# Patient Record
Sex: MALE | Race: WHITE
[De-identification: names, ages, dates, MRNs, and addresses within clinical notes are randomized per-mention and may not be internally consistent; named-entity substitution may affect disease eponyms.]

---

## 2015-07-24 VITALS
DIASTOLIC BLOOD PRESSURE: 76 MMHG | DIASTOLIC BLOOD PRESSURE: 76 MMHG | DIASTOLIC BLOOD PRESSURE: 76 MMHG | SYSTOLIC BLOOD PRESSURE: 133 MMHG | SYSTOLIC BLOOD PRESSURE: 133 MMHG | SYSTOLIC BLOOD PRESSURE: 133 MMHG

## 2017-03-14 ENCOUNTER — HOSPITAL ENCOUNTER (OUTPATIENT)
Dept: HOSPITAL 61 - LAB | Age: 67
Discharge: HOME | End: 2017-03-14
Attending: ORTHOPAEDIC SURGERY
Payer: COMMERCIAL

## 2017-03-14 DIAGNOSIS — M25.511: Primary | ICD-10-CM

## 2017-03-14 LAB
BASOPHILS # BLD AUTO: 0.1 X10^3/UL (ref 0–0.2)
BASOPHILS NFR BLD: 1 % (ref 0–3)
EOSINOPHIL NFR BLD: 3 % (ref 0–3)
ERYTHROCYTE [DISTWIDTH] IN BLOOD BY AUTOMATED COUNT: 13.9 % (ref 11.5–14.5)
HCT VFR BLD CALC: 48.2 % (ref 39–53)
HGB BLD-MCNC: 16.6 G/DL (ref 13–17.5)
LYMPHOCYTES # BLD: 1.5 X10^3/UL (ref 1–4.8)
LYMPHOCYTES NFR BLD AUTO: 17 % (ref 24–48)
MCH RBC QN AUTO: 30 PG (ref 25–35)
MCHC RBC AUTO-ENTMCNC: 34 G/DL (ref 31–37)
MCV RBC AUTO: 87 FL (ref 79–100)
MONOCYTES NFR BLD: 7 % (ref 0–9)
NEUTROPHILS NFR BLD AUTO: 72 % (ref 31–73)
PLATELET # BLD AUTO: 228 X10^3/UL (ref 140–400)
RBC # BLD AUTO: 5.51 X10^6/UL (ref 4.3–5.7)
WBC # BLD AUTO: 9.2 X10^3/UL (ref 4–11)

## 2017-03-14 PROCEDURE — 85651 RBC SED RATE NONAUTOMATED: CPT

## 2017-03-14 PROCEDURE — 86140 C-REACTIVE PROTEIN: CPT

## 2017-03-14 PROCEDURE — 36415 COLL VENOUS BLD VENIPUNCTURE: CPT

## 2017-03-14 PROCEDURE — 85027 COMPLETE CBC AUTOMATED: CPT

## 2017-03-20 ENCOUNTER — HOSPITAL ENCOUNTER (OUTPATIENT)
Dept: HOSPITAL 61 - KCIC | Age: 67
Discharge: HOME | End: 2017-03-20
Attending: ORTHOPAEDIC SURGERY
Payer: COMMERCIAL

## 2017-03-20 DIAGNOSIS — M75.21: ICD-10-CM

## 2017-03-20 DIAGNOSIS — S43.421A: Primary | ICD-10-CM

## 2017-03-20 PROCEDURE — 72141 MRI NECK SPINE W/O DYE: CPT

## 2017-03-20 PROCEDURE — A9585 GADOBUTROL INJECTION: HCPCS

## 2017-03-20 PROCEDURE — 73222 MRI JOINT UPR EXTREM W/DYE: CPT

## 2017-03-20 PROCEDURE — 73040 CONTRAST X-RAY OF SHOULDER: CPT

## 2017-03-20 NOTE — [facility]
PROCEDURE: 

Right shoulder injection using fluoroscopic guidance, prior to MR. 

HISTORY: 

Shoulder pain. 

TECHNIQUE: 

The procedure was explained to the patient as were potential risks, 

including among others infection, bleeding or allergic reaction. All 

questions were answered. Informed written and verbal consent was obtained.

The shoulder was prepped and draped in the usual sterile manner. Following

administration of local anesthetic, a 22-gauge needle was advanced into 

the anterior shoulder. Following negative aspiration, 12 cc of a solution 

of 5cc Omnipaque-300 contrast, 5 cc 1% lidocaine, 10 cc normal saline, and

0.1 cc gadolinium was injected without difficulty. The needle was removed.

There was good hemostasis at the injection site. The patient left in 

stable condition without immediate complication. The patient was given 

postprocedural instructions, and instructed to contact us or the ER if 

there are any complications. 

A single spot image is obtained. 

FLUOROSCOPY TIME: 38 seconds 

 

Electronically signed by: Luc Castro MD (Mar 20, 2017 16:56:25)

## 2017-03-20 NOTE — [facility]
PROCEDURE 

MRI of the cervical spine without contrast 03/20/2017 

 

HISTORY 

Neck pain which radiates down the right shoulder. 

 

TECHNIQUE 

Unenhanced T1 weighted, T2 weighted and inversion recovery sagittal and 

gradient echo and T2 weighted axial images of the cervical spine were 

obtained. 

 

FINDINGS 

Minimal lateral curvature of the cervical spine is seen convex to the 

right. There is mild straightening of the normal cervical lordosis. 

Degenerative signal changes are seen involving all of the discs of the 

cervical spine. The marrow signal of the visualized bony structures is 

within normal limits. The cervical spinal cord is normal in morphology, 

position, and signal characteristics. 

On the axial images degenerative changes are seen throughout the cervical 

disc spaces consisting of minimal to mild generalized disc bulges and 

degenerative changes involving the uncovertebral and facet joints. Small 

superimposed focal disc protrusions are seen which measure 2 millimeters 

in AP diameter. These findings do not result in significant central spinal

canal or neural foraminal stenosis at any level. 

 

 

IMPRESSION 

Degenerative changes are seen involving the cervical spine. These findings

do not result in significant central spinal canal or neural foraminal 

stenosis at any level. 

 

Electronically signed by: Sj Montano MD (Mar 20, 2017 14:36:00)

## 2017-03-20 NOTE — [facility]
PROCEDURE 

MR arthrogram of the right shoulder 

 

HISTORY 

Right shoulder pain for 6-8 months. Rotator cuff and biceps tendon repair 

May 2016. 

 

COMPARISON 

None 

 

TECHNIQUE 

Intra-articular contrast injected prior to the scan, and reported 

separately. The standard 4 plane sequences were obtained including ABER 

positioning. 

 

FINDINGS 

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 

Evidence of rotator cuff repair with suture anchors at the humeral head. 

Diffuse thinning of the undersurface of supraspinatus and infraspinatus 

tendons, greatest at the supraspinatus myotendinous junction measuring as 

thin as 2 mm.. No evidence of a through and through rupture. No evidence 

of subscapularis tendon tear. Some contrast within the subscapularis is 

thought to be due to the injection. Contrast does accumulate within the 

subdeltoid bursa. 

Blunting and deformity of the superior labrum and to a lesser extent the 

posterior labrum, compatible with tearing, unless postsurgical. The 

proximal biceps tendon is poorly visualized. 

No bone lesion or acute fracture. No acute soft tissue injury. 

 

IMPRESSION 

1. Diffuse thinning of the supraspinatus and infraspinatus tendons, could 

be due to the nature the reattached tendon. No through and through 

full-thickness rupture.

2. Small and blunted posterosuperior labrum compatible with tearing unless

this is postsurgical.

3. Poorly seen proximal biceps tendon.

 

 

 

Electronically signed by: Luc Castro MD (Mar 20, 2017 15:27:45)

## 2017-04-24 ENCOUNTER — HOSPITAL ENCOUNTER (OUTPATIENT)
Dept: HOSPITAL 61 - SURGPAT | Age: 67
Discharge: HOME | End: 2017-04-24
Attending: ORTHOPAEDIC SURGERY
Payer: COMMERCIAL

## 2017-04-24 DIAGNOSIS — Z01.818: Primary | ICD-10-CM

## 2017-04-24 LAB
ANION GAP SERPL CALC-SCNC: 10 MMOL/L (ref 6–14)
APTT BLD: 39 SEC (ref 24–38)
BACTERIA #/AREA URNS HPF: (no result) /HPF
BASOPHILS # BLD AUTO: 0.1 X10^3/UL (ref 0–0.2)
BASOPHILS NFR BLD: 1 % (ref 0–3)
BILIRUB UR QL STRIP: (no result)
BUN SERPL-MCNC: 12 MG/DL (ref 8–26)
CALCIUM SERPL-MCNC: 9 MG/DL (ref 8.5–10.1)
CHLORIDE SERPL-SCNC: 102 MMOL/L (ref 98–107)
CO2 SERPL-SCNC: 30 MMOL/L (ref 21–32)
CREAT SERPL-MCNC: 0.9 MG/DL (ref 0.7–1.3)
EOSINOPHIL NFR BLD: 4 % (ref 0–3)
ERYTHROCYTE [DISTWIDTH] IN BLOOD BY AUTOMATED COUNT: 13.7 % (ref 11.5–14.5)
GFR SERPLBLD BASED ON 1.73 SQ M-ARVRAT: 84.7 ML/MIN
GLUCOSE SERPL-MCNC: 118 MG/DL (ref 70–99)
GLUCOSE UR STRIP-MCNC: NEGATIVE MG/DL
HCT VFR BLD CALC: 46.5 % (ref 39–53)
HGB BLD-MCNC: 15.6 G/DL (ref 13–17.5)
INR PPP: 1.1 (ref 0.8–1.1)
LYMPHOCYTES # BLD: 1.3 X10^3/UL (ref 1–4.8)
LYMPHOCYTES NFR BLD AUTO: 24 % (ref 24–48)
MCH RBC QN AUTO: 30 PG (ref 25–35)
MCHC RBC AUTO-ENTMCNC: 34 G/DL (ref 31–37)
MCV RBC AUTO: 90 FL (ref 79–100)
MONOCYTES NFR BLD: 9 % (ref 0–9)
NEUTROPHILS NFR BLD AUTO: 62 % (ref 31–73)
NITRITE UR QL STRIP: NEGATIVE
PH UR STRIP: 5.5 [PH]
PLATELET # BLD AUTO: 204 X10^3/UL (ref 140–400)
POTASSIUM SERPL-SCNC: 3.1 MMOL/L (ref 3.5–5.1)
PROT UR STRIP-MCNC: 30 MG/DL
PROTHROMBIN TIME: 13.4 SEC (ref 11.7–14)
RBC # BLD AUTO: 5.15 X10^6/UL (ref 4.3–5.7)
RBC #/AREA URNS HPF: (no result) /HPF (ref 0–2)
SODIUM SERPL-SCNC: 142 MMOL/L (ref 136–145)
SP GR UR STRIP: >=1.03
SQUAMOUS #/AREA URNS LPF: (no result) /LPF
UROBILINOGEN UR-MCNC: 1 MG/DL
WBC # BLD AUTO: 5.2 X10^3/UL (ref 4–11)
WBC #/AREA URNS HPF: (no result) /HPF (ref 0–4)

## 2017-04-24 PROCEDURE — 36415 COLL VENOUS BLD VENIPUNCTURE: CPT

## 2017-04-24 PROCEDURE — 87641 MR-STAPH DNA AMP PROBE: CPT

## 2017-04-24 PROCEDURE — 85730 THROMBOPLASTIN TIME PARTIAL: CPT

## 2017-04-24 PROCEDURE — 85651 RBC SED RATE NONAUTOMATED: CPT

## 2017-04-24 PROCEDURE — 80048 BASIC METABOLIC PNL TOTAL CA: CPT

## 2017-04-24 PROCEDURE — 85027 COMPLETE CBC AUTOMATED: CPT

## 2017-04-24 PROCEDURE — 71020: CPT

## 2017-04-24 PROCEDURE — 82040 ASSAY OF SERUM ALBUMIN: CPT

## 2017-04-24 PROCEDURE — 85610 PROTHROMBIN TIME: CPT

## 2017-04-24 PROCEDURE — 83036 HEMOGLOBIN GLYCOSYLATED A1C: CPT

## 2017-04-24 PROCEDURE — 93005 ELECTROCARDIOGRAM TRACING: CPT

## 2017-04-24 PROCEDURE — 81001 URINALYSIS AUTO W/SCOPE: CPT

## 2017-04-24 NOTE — RAD
Chest, 2 views, 4/24/2017:



History: Preop evaluation for rotator cuff surgery.



The heart size and pulmonary vascularity are normal. There is calcific

plaquing of the aorta. There is mild bibasilar linear scarring or atelectasis.

The lungs are otherwise clear. There is no evidence of pleural fluid.



IMPRESSION:

1. Aortic atherosclerosis.

2. Mild bibasilar linear scarring or atelectasis.

## 2017-04-24 NOTE — EKG
[facility]

              [address] [place] [address]

Test Date:    2017               Test Time:    10:27:25

Pat Name:     FARSHAD CORMIER             Department:   

Patient ID:   [id number]           Room:          

Gender:       M                        Technician:   TV

:          1950               Requested By: CATY PATINO

Order Number: [id number]            Reading MD:   Stevo Wright

                                 Measurements

Intervals                              Axis          

Rate:         59                       P:            40

OR:           184                      QRS:          -4

QRSD:         100                      T:            22

QT:           410                                    

QTc:          410                                    

                           Interpretive Statements

SINUS RHYTHM



Electronically Signed On 2017 12:24:59 CDT by Stevo Wright

## 2018-02-27 ENCOUNTER — HOSPITAL ENCOUNTER (EMERGENCY)
Dept: HOSPITAL 61 - ER | Age: 68
Discharge: HOME | End: 2018-02-27
Payer: COMMERCIAL

## 2018-02-27 DIAGNOSIS — I10: ICD-10-CM

## 2018-02-27 DIAGNOSIS — Z88.8: ICD-10-CM

## 2018-02-27 DIAGNOSIS — T14.8XXA: Primary | ICD-10-CM
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
## 2018-02-27 DIAGNOSIS — E11.9: ICD-10-CM
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
## 2018-02-27 DIAGNOSIS — Z90.49: ICD-10-CM
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
## 2018-02-27 DIAGNOSIS — E78.00: ICD-10-CM

## 2018-02-27 DIAGNOSIS — Z95.5: ICD-10-CM
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
## 2018-02-27 PROCEDURE — 99283 EMERGENCY DEPT VISIT LOW MDM: CPT

## 2019-08-15 ENCOUNTER — HOSPITAL ENCOUNTER (EMERGENCY)
Dept: HOSPITAL 61 - ER | Age: 69
Discharge: HOME | End: 2019-08-15
Payer: COMMERCIAL

## 2019-08-15 VITALS — DIASTOLIC BLOOD PRESSURE: 82 MMHG | SYSTOLIC BLOOD PRESSURE: 170 MMHG

## 2019-08-15 VITALS — BODY MASS INDEX: 37.65 KG/M2 | HEIGHT: 72 IN | WEIGHT: 278 LBS
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
## 2019-08-15 DIAGNOSIS — S39.012A: ICD-10-CM

## 2019-08-15 DIAGNOSIS — E11.9: ICD-10-CM

## 2019-08-15 DIAGNOSIS — Z88.8: ICD-10-CM
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
## 2019-08-15 DIAGNOSIS — S16.1XXA: Primary | ICD-10-CM

## 2019-08-15 DIAGNOSIS — I10: ICD-10-CM

## 2019-08-15 DIAGNOSIS — Z90.49: ICD-10-CM
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
## 2019-08-15 DIAGNOSIS — E78.00: ICD-10-CM

## 2019-08-15 DIAGNOSIS — S29.012A: ICD-10-CM

## 2019-08-15 PROCEDURE — 72100 X-RAY EXAM L-S SPINE 2/3 VWS: CPT

## 2019-08-15 PROCEDURE — 72125 CT NECK SPINE W/O DYE: CPT

## 2019-08-15 PROCEDURE — 72072 X-RAY EXAM THORAC SPINE 3VWS: CPT

## 2019-08-15 PROCEDURE — 99284 EMERGENCY DEPT VISIT MOD MDM: CPT
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
